# Patient Record
Sex: FEMALE | Race: OTHER
[De-identification: names, ages, dates, MRNs, and addresses within clinical notes are randomized per-mention and may not be internally consistent; named-entity substitution may affect disease eponyms.]

---

## 2023-03-27 PROBLEM — Z00.00 ENCOUNTER FOR PREVENTIVE HEALTH EXAMINATION: Status: ACTIVE | Noted: 2023-03-27

## 2023-04-04 ENCOUNTER — APPOINTMENT (OUTPATIENT)
Dept: THORACIC SURGERY | Facility: CLINIC | Age: 38
End: 2023-04-04
Payer: COMMERCIAL

## 2023-04-04 VITALS
SYSTOLIC BLOOD PRESSURE: 107 MMHG | BODY MASS INDEX: 29.45 KG/M2 | RESPIRATION RATE: 18 BRPM | OXYGEN SATURATION: 98 % | DIASTOLIC BLOOD PRESSURE: 73 MMHG | HEIGHT: 60 IN | WEIGHT: 150 LBS | HEART RATE: 90 BPM

## 2023-04-04 DIAGNOSIS — Z82.49 FAMILY HISTORY OF ISCHEMIC HEART DISEASE AND OTHER DISEASES OF THE CIRCULATORY SYSTEM: ICD-10-CM

## 2023-04-04 DIAGNOSIS — Z83.438 FAMILY HISTORY OF OTHER DISORDER OF LIPOPROTEIN METABOLISM AND OTHER LIPIDEMIA: ICD-10-CM

## 2023-04-04 DIAGNOSIS — Z78.9 OTHER SPECIFIED HEALTH STATUS: ICD-10-CM

## 2023-04-04 DIAGNOSIS — R07.81 PLEURODYNIA: ICD-10-CM

## 2023-04-04 DIAGNOSIS — Z83.3 FAMILY HISTORY OF DIABETES MELLITUS: ICD-10-CM

## 2023-04-04 PROCEDURE — 99205 OFFICE O/P NEW HI 60 MIN: CPT

## 2023-04-05 ENCOUNTER — NON-APPOINTMENT (OUTPATIENT)
Age: 38
End: 2023-04-05

## 2023-04-06 PROBLEM — R07.81 RIB PAIN ON RIGHT SIDE: Status: ACTIVE | Noted: 2023-04-04

## 2023-04-06 NOTE — ASSESSMENT
[FreeTextEntry1] : Ms. ADIN LEE, 37 year old female, never a smoker, w/ no significant past medical history. February, 2022 started experiencing pain and heaviness to right lower rib cage. Cannot correlate to any specific injury or event. Pain has continued to persist despite course of NASIDs. She had established care with pain management s/p steroid and Lidocaine injection w/ no relief. \par \par Here today for consultation. \par \par I have independently reviewed the medical records and imaging at the time of this office consultation, and discussed the following interpretations with the patient:\par - Images reviewed; No rib abnormality noted. Suspect pain is related to cartilage, which cannot be seen on the scans. Discussed that resecting the right 12th rib may not resolve symptoms, and can even possibly worsen her pain. At this time, the discomfort is not greatly affecting her quality of life. She will consider all options and contact the office if she decides to proceed with surgical intervention. \par - s/p 10 course of NSAID. Discussed that it may be beneficial to extend up to 30 days. \par - Can refer to new pain management physician if needed. \par \par I have independently reviewed the medical records and imaging at the time of this office consultation, and discussed the following interpretations with the patient:\par \par \par I, MARIETTA JungIM, personally performed the evaluation and management (E/M) services for this new patient. That E/M includes conducting the initial examination, assessing all conditions, and establishing the plan of care. Today, My ACP, Aleah Alvarado, was here to observe my evaluation and management services for this patient to be followed going forward.\par \par \par

## 2023-04-06 NOTE — PHYSICAL EXAM
[Fully active, able to carry on all pre-disease performance without restriction] : Status 0 - Fully active, able to carry on all pre-disease performance without restriction [General Appearance - Alert] : alert [General Appearance - In No Acute Distress] : in no acute distress [General Appearance - Well Nourished] : well nourished [Sclera] : the sclera and conjunctiva were normal [Extraocular Movements] : extraocular movements were intact [Neck Appearance] : the appearance of the neck was normal [Neck Cervical Mass (___cm)] : no neck mass was observed [] : no respiratory distress [Respiration, Rhythm And Depth] : normal respiratory rhythm and effort [Exaggerated Use Of Accessory Muscles For Inspiration] : no accessory muscle use [Auscultation Breath Sounds / Voice Sounds] : lungs were clear to auscultation bilaterally [Heart Rate And Rhythm] : heart rate was normal and rhythm regular [Examination Of The Chest] : the chest was normal in appearance [Chest Visual Inspection Thoracic Asymmetry] : no chest asymmetry [Diminished Respiratory Excursion] : normal chest expansion [2+] : left 2+ [Breast Appearance] : normal in appearance [Breast Palpation Mass] : no palpable masses [Bowel Sounds] : normal bowel sounds [Abdomen Soft] : soft [Abdomen Tenderness] : non-tender [Cervical Lymph Nodes Enlarged Posterior Bilaterally] : posterior cervical [Cervical Lymph Nodes Enlarged Anterior Bilaterally] : anterior cervical [Supraclavicular Lymph Nodes Enlarged Bilaterally] : supraclavicular [No CVA Tenderness] : no ~M costovertebral angle tenderness [No Spinal Tenderness] : no spinal tenderness [Involuntary Movements] : no involuntary movements were seen [Skin Color & Pigmentation] : normal skin color and pigmentation [No Focal Deficits] : no focal deficits [Oriented To Time, Place, And Person] : oriented to person, place, and time [FreeTextEntry1] : Tender upon palpating the anterior portion of right lower rib; No ribcage instability

## 2023-04-06 NOTE — HISTORY OF PRESENT ILLNESS
[FreeTextEntry1] : Ms. ADIN LEE, 37 year old female, never a smoker, w/ no significant past medical history. February, 2022 started experiencing pain and heaviness to right lower rib cage. Cannot correlate to any specific injury or event. Pain has continued to persist despite course of NASIDs. She had established care with pain management s/p steroid and Lidocaine injection w/ no relief. \par \par CT Abdomen w/ IV Contrast on 2/26/22:\par - Unremarkable examination of the gallbladder and biliary tree\par - Mildly enlarged liver demonstrating fatty infiltration\par - 1.5 cm left lower pole renal cyst\par \par CT Chest on 5/16/22: \par - Tiny linear subpleural density left lower lobe 8:80, likely represents atelectasis/scar\par - No suspicious osseus lesions\par - No visualized rib fractures \par \par Patient presents today for CTSX evaluation. Today, patient denies worsening SOB, chest pain, cough, hemoptysis, fever, chills, night sweats, lightheadedness or dizziness.\par

## 2023-04-06 NOTE — DATA REVIEWED
[FreeTextEntry1] : Independently reviewed the following:\par - CT Abdomen w/ IV Contrast on 2/26/22:\par - CT Chest on 5/16/22: